# Patient Record
(demographics unavailable — no encounter records)

---

## 2017-08-23 NOTE — DISCHARGE INSTRUCTIONS
Endoscopy Patient Instructions


Date / Procedure(s) Performed


Aug 23, 2017.


Colonoscopy





Allergy Information


Coded Allergies:  


     Ciprofloxacin (Verified  Allergy, Unknown, irregular heartbeat, 8/23/17)


     Aspirin (Verified  Adverse Reaction, Intermediate, palpitations, 8/23/17)


     Dipyridamole (Verified  Adverse Reaction, Intermediate, palpitations, 8/23/ 17)





Discharge Date / Findings


Aug 23, 2017.


Diverticulosis


Internal hemorrhoids





Medication Instructions


Stopped Medication(s):  


ASPIRIN 81MG 8/22





PLAVIX 8/18


OK to resume all medications today as prescribed





Reported Home Medications








 Medications  Dose


 Route/Sig


 Max Daily Dose Days Date Category


 


 Coenzyme Q10


  (Coenzyme Q10


  (Ubidecarenone))


 10 Mg Cap  1 Cap


 PO QAM


    8/10/17 Reported


 


 Travatan Z


  (Travoprost)


 0.004 % Aaron  1 Drops


 OPB HS


    8/10/17 Reported


 


 Pravastatin


 Sodium


  (Pravastatin Sod)


 10 Mg Tab  1 Tab


 PO QPM


    8/10/17 Reported


 


 Vitamin D


  (Cholecalciferol)


 1,000 Unit Tab  1 Tab


 PO QPM


    8/10/17 Reported


 


 Aspirin Ec


  (Aspirin) 81 Mg


 Tab  81 Mg


 PO QPM


    8/10/17 Reported


 


 Plavix


  (Clopidogrel


 Bisulfate) 75 Mg


 Tab  75 Mg


 PO QPM


    2/6/15 Reported


 


 Synthroid


  (Levothyroxine


 Sodium) 50 Mcg Tab  50 Mcg


 PO QAM


    12/9/14 Reported


 


 Klonopin


  (Clonazepam) 0.5


 Mg Tab  0.5 Mg


 PO HS PRN


    6/17/14 Reported


 


 Fish Oil (Omega-3


 Fatty Acids) 1


 Cap Cap  1 Cap


 PO QAM


    6/17/14 Reported











Provider Instructions





Activity Restrictions





-  No exercising or heavy lifting for 24 hours. 


-  Do not drink alcohol the day of the procedure.


-  Do not drive a car or operate machinery until the day after the procedure.


-  Do not make any important decisions or sign important papers in 24 hours 

after the procedure.





Following Day:





-  Return to full activity which may include returning to work/school.





Diet





Start your diet with liquids and light foods (jello, soup, juice, toast).  Then 

eat your usual diet if not nauseated.





Treatment For Common After Affects





For mild abdominal pain, bloating, or excessive gas:





-  Rest


-  Eat lightly


-  Lie on right side





Follow-Up Information


Follow-up with DR HINTON as scheduled





Anesthesia Information





What You Should Know





You have had a procedure that required some medicine to reduce anxiety and 

discomfort. This treatment is called moderate sedation.  


After receiving the treatment, you may be sleepy, but you will be able to 

breathe on your own.  The effects of the treatment may last for several hours.








Follow these instructions along with Activity/Diet recommendations noted above:





*  Do NOT do anything where dizziness or clumsiness would be dangerous.





*  Rest quietly at home today, then you can be up and about tomorrow.





*  Have a responsible person stay with you the rest of today.





*  You may have had an I.V. today.  If so, you may take the dressing off later 

today.





Recommendations


 


Call your doctor if:





*  Trouble breathing 





*  Continuous vomiting for more than 24 hours








*  Temperature above 101 degrees





*  Severe abdominal pain or bloating





*  Pain not relieved by pain medicine ordered





*  There is increased drainage or redness from any incision





*  A large amount of rectal bleeding greater than 2-3 tablespoons. 


   (If you had a polyp/s removed or have hemorrhoids, a small amount of blood -


    from the rectum is to be expected.)





*  You have any unanswered questions or concerns.








IN THE EVENT OF A SERIOUS EMERGENCY, GO TO THE NEAREST EMERGENCY ROOM








       Your discharge instructions were prepared by provider Tj Davis.





 Patient Instructions Signature Page














Susanne George 











Patient (or Guardian) Signature/Date:____________________________________ I 

have read and understand the instructions given to me by my caregivers.








Caregiver/RN/Doctor Signature/Date:____________________________________











The above-named patient and/or guardian has received patient instructions on 

this date.





























+  Original Patient Signature Page (only) stays with chart.  Please make copy 

for patient.

## 2017-08-23 NOTE — ENDO HISTORY AND PHYSICAL
History & Physical


Date of Service:


Aug 23, 2017.


Chief Complaint:


HISTORY OF POLYPS


Referring Physician:


DR HINTON


History of Present Illness


75 yo CF who presents for colonoscopy secondary to history of colon polyps.





Past Medical History


Osteoporosis, Arthritis, Reflux, High Cholesterol, Thyroid Disease, CVA/TIA, 

Depression





Past Surgical History


Hx Cardiac Surgery:  Yes (INTERNAL CARDIAC MONITOR)


Hx Internal Defibrillator:  No


Hx Pacemaker:  No


Hx Abdominal Surgery:  No


Hx of Implantable Prosthesis:  No


Hx Post-Op Nausea and Vomiting:  No


Hx Cancer Surgery:  Yes (BCC REMOVAL)


Hx Thoracic Surgery:  No


Hx Orthopedic:  No


Hx Urinary Tract Surgery:  No





Family History


None





Social History


Smoking Status:  Never Smoker


Hx Substance Use:  No


Hx Alcohol Use:  Yes (OCCASIONAL)





Allergies


Coded Allergies:  


     Ciprofloxacin (Verified  Allergy, Unknown, irregular heartbeat, 8/23/17)


     Aspirin (Verified  Adverse Reaction, Intermediate, palpitations, 8/23/17)


     Dipyridamole (Verified  Adverse Reaction, Intermediate, palpitations, 8/23/ 17)





Current Medications





Reported Home Medications








 Medications  Dose


 Route/Sig


 Max Daily Dose Days Date Category


 


 Coenzyme Q10


  (Coenzyme Q10


  (Ubidecarenone))


 10 Mg Cap  1 Cap


 PO QAM


    8/10/17 Reported


 


 Travatan Z


  (Travoprost)


 0.004 % Aaron  1 Drops


 OPB HS


    8/10/17 Reported


 


 Pravastatin


 Sodium


  (Pravastatin Sod)


 10 Mg Tab  1 Tab


 PO QPM


    8/10/17 Reported


 


 Vitamin D


  (Cholecalciferol)


 1,000 Unit Tab  1 Tab


 PO QPM


    8/10/17 Reported


 


 Aspirin Ec


  (Aspirin) 81 Mg


 Tab  81 Mg


 PO QPM


    8/10/17 Reported


 


 Plavix


  (Clopidogrel


 Bisulfate) 75 Mg


 Tab  75 Mg


 PO QPM


    2/6/15 Reported


 


 Synthroid


  (Levothyroxine


 Sodium) 50 Mcg Tab  50 Mcg


 PO QAM


    12/9/14 Reported


 


 Klonopin


  (Clonazepam) 0.5


 Mg Tab  0.5 Mg


 PO HS PRN


    6/17/14 Reported


 


 Fish Oil (Omega-3


 Fatty Acids) 1


 Cap Cap  1 Cap


 PO QAM


    6/17/14 Reported











Vital Signs


Weight (Kilograms):  72.73


Height (Feet):  5


Height (Inches):  3





Physical Exam


General Appearance:  WD/WN, no apparent distress


Respiratory/Chest:  


   Auscultation:  breath sounds normal


Cardiovascular:  


   Heart Auscultation:  RRR


Abdomen:  


   Bowel Sounds:  normal


   Inspection & Palpation:  soft, non-distended, no tenderness, guarding & 

rebound





Assessment and Plan


Assessment:


75 yo CF who presents for colonoscopy secondary to history of colon polyps.








Plan:


Proceed with colonoscopy.

## 2017-08-23 NOTE — ANESTHESIOLOGY PROGRESS NOTE
Anesthesia Post Op Note


Date & Time


Aug 23, 2017 at 15:45





Vital Signs


Pain Intensity:  0





Vital Signs Past 12 Hours








  Date Time  Temp Pulse Resp B/P (MAP) Pulse Ox O2 Delivery O2 Flow Rate FiO2


 


8/23/17 15:42  68 16 124/70 (88) 96 Room Air  


 


8/23/17 15:27  68 12 94/65 (75) 97 Room Air  


 


8/23/17 14:42 36.8 81 18 119/77 (91) 97 Room Air  











Notes


Mental Status:  alert / awake / arousable, participated in evaluation


Pt Amnestic to Procedure:  Yes


Nausea / Vomiting:  adequately controlled


Pain:  adequately controlled


Airway Patency, RR, SpO2:  stable & adequate


BP & HR:  stable & adequate


Hydration State:  stable & adequate


Anesthetic Complications:  no major complications apparent

## 2017-08-24 NOTE — GI REPORT
Procedure Date: 8/23/2017 3:08 PM

Procedure:            Colonoscopy

Indications:          High risk colon cancer surveillance: Personal history 

                      of colonic polyps

Medicines:            Monitored Anesthesia Care

Complications:        No immediate complications.

Estimated Blood Loss: Estimated blood loss: none.

Procedure:            Pre-Anesthesia Assessment:

                      - Prior to the procedure, a History and Physical was 

                      performed, and patient medications and allergies were 

                      reviewed. The patient's tolerance of previous 

                      anesthesia was also reviewed. The risks and benefits of 

                      the procedure and the sedation options and risks were 

                      discussed with the patient. All questions were 

                      answered, and informed consent was obtained. Prior 

                      Anticoagulants: The patient last took aspirin 1 day and 

                      Plavix (clopidogrel) 5 days prior to the procedure. ASA 

                      Grade Assessment: III - A patient with severe systemic 

                      disease. After reviewing the risks and benefits, the 

                      patient was deemed in satisfactory condition to undergo 

                      the procedure.

                      After I obtained informed consent, the scope was passed 

                      under direct vision. Throughout the procedure, the 

                      patient's blood pressure, pulse, and oxygen saturations 

                      were monitored continuously. The scope was introduced 

                      through the anus and advanced to the terminal ileum. 

                      The colonoscopy was performed without difficulty. The 

                      patient tolerated the procedure well. The quality of 

                      the bowel preparation was good. The terminal ileum, 

                      ileocecal valve, appendiceal orifice, and rectum were 

                      photographed.

Findings:

     Multiple small-mouthed diverticula were found in the sigmoid colon.

     Non-bleeding internal hemorrhoids were found during retroflexion. The 

     hemorrhoids were small.

Impression:           - Diverticulosis in the sigmoid colon.

                      - Non-bleeding internal hemorrhoids.

                      - No specimens collected.

Recommendation:       - Resume previous diet.

                      - Continue present medications.

                      - No repeat colonoscopy due to age and the absence of 

                      advanced adenomas.

                      - Return to primary care physician as previously 

                      scheduled.

Tj Davis, 

8/23/2017 3:29:21 PM

This report has been signed electronically.

Note Initiated On: 8/23/2017 3:08 PM

     I attest to the content of the Intraoperative Record and orders 

     documented therein, exceptions below

## 2018-04-26 NOTE — MAMMOGRAPHY REPORT
BILATERAL DIGITAL SCREENING MAMMOGRAM TOMOSYNTHESIS WITH CAD: 4/25/2018

CLINICAL HISTORY: Routine screening.  Patient has no complaints.  





TECHNIQUE:  Breast tomosynthesis in addition to standard 2D mammography was performed. Current study 
was also evaluated with a Computer Aided Detection (CAD) system.  



COMPARISON: Comparison is made to exams dated:  7/3/2017 mammogram, 7/3/2017 ultrasound, 12/28/2016 m
ammogram, 12/28/2016 ultrasound, 12/19/2016 mammogram, and 12/16/2015 mammogram - [facility].   



BREAST COMPOSITION:  There are scattered areas of fibroglandular density in both breasts.  



FINDINGS: A rectangular metallic loop recording device projects over the lower inner far posterior le
ft breast.  The glandular pattern is similar to prior mammograms.  There are mild vascular calcificat
ions.  No new suspicious mass, architectural distortion or cluster of microcalcifications is seen.  



IMPRESSION:  ACR BI-RADS CATEGORY 1: NEGATIVE

There is no mammographic evidence of malignancy. A 1 year screening mammogram is recommended.  The pa
tient will receive written notification of the results.  





Approximately 10% of breast cancers are not detected with mammography. A negative mammographic report
 should not delay biopsy if a clinically suggestive mass is present.



Jeri Sanchez M.D.          

ay/:4/25/2018 15:40:23  



Imaging Technologist: Kanchan SANCHEZ(JELENA)(M), [facility]

letter sent: Normal 1/2  

BI-RADS Code: ACR BI-RADS Category 1: Negative

## 2022-01-06 NOTE — MAMMOGRAPHY REPORT
Final Anesthesia Post-op Assessment    Patient: Isabel Rivera  Procedure(s) Performed: ESOPHAGOGASTRODUODENOSCOPYDILATION, ESOPHAGUS, ENDOSCOPIC  Anesthesia type: MAC    Vitals Value Taken Time   Temp 36.4 °C (97.6 °F) 01/06/22 1139   Pulse 78 01/06/22 1144   Resp 30 01/06/22 1144   SpO2 96 % 01/06/22 1144   /64 01/06/22 1144         Patient Location: Phase II  Post-op Vital Signs:stable  Level of Consciousness: awake, alert, follows commands and participates in exam  Respiratory Status: spontaneous ventilation and room air  Cardiovascular stable  Hydration: euvolemic  Pain Management: adequately controlled  Handoff: Handoff to receiving clinician was performed and questions were answered  Vomiting: none  Nausea: None  Airway Patency:patent  Post-op Assessment: awake, alert, appropriately conversant, or baseline, no complications and patient tolerated procedure well with no complications      No complications documented.    UNILATERAL RIGHT DIGITAL DIAGNOSTIC MAMMOGRAM TOMOSYNTHESIS WITH CAD AND TARGETED RIGHT ULTRASOUND: 7
/3/2017

CLINICAL HISTORY: 74 year-old woman presents for follow-up of a small 3 mm nodular asymmetry in the i
nferior right breast, only seen on the MLO view, and no suspicious sonographic correlate.  





TECHNIQUE: Right breast tomosynthesis in addition to standard 2D mammography was performed. Current alla alexander was also evaluated with a Computer Aided Detection (CAD) system.  



COMPARISON: Comparison is made to exams dated:  12/28/2016 mammogram, 12/28/2016 ultrasound, 12/19/20
16 mammogram, 12/16/2015 mammogram, 12/15/2014 mammogram, and 12/12/2013 mammogram - [facility].   



BREAST COMPOSITION:  There are scattered areas of fibroglandular density in the right breast.  



FINDINGS: The small, 3.2 mm nodular asymmetry in the inferior, middle to anterior right breast on the
 MLO view is no longer clearly identified.  No obvious mass is identified on the corresponding tomosy
nthesis images.  No new suspicious mass, architectural distortion or cluster of microcalcifications i
s seen throughout the right breast.  There is a stable 5 mm circumscribed mass in the far superior ri
ght breast on the MLO view that is unchanged in size dating back to at least 09/10/2010, most likely 
an intramammary lymph node.  



Targeted ultrasound was performed in the inferior right breast.  Sonographically normal tissue is see
n without a discrete solid or cystic mass.



IMPRESSION:  ACR BI-RADS CATEGORY 2: BENIGN, TARGETED ULTRASOUND ACR BI-RADS CATEGORY 2: BENIGN 

Less prominent nodular asymmetry in the inferior right breast on the MLO view, and no suspicious sono
graphic correlate.  These findings confirm benignity and no further close follow-up is needed at this
 time.  Recommend return to annual screening mammography schedule, due in December 2017.  The patient
 will be out of town until April 2018 and I recommended mammography as soon as possible once she retu
rns. 





Approximately 10% of breast cancers are not detected with mammography. A negative mammographic report
 should not delay biopsy if a clinically suggestive mass is present.



Jeri Sanchez M.D.          

ay/:7/3/2017 12:13:13  



Imaging Technologist: Buffy SANCHEZ(JELENA)(JESSI), [facility]

letter sent: Normal 1/2  

BI-RADS Code: ACR BI-RADS Category 2: Benign  Ultrasound BI-RADS: ACR BI-RADS Category 2: Benign